# Patient Record
Sex: FEMALE | Race: WHITE | URBAN - METROPOLITAN AREA
[De-identification: names, ages, dates, MRNs, and addresses within clinical notes are randomized per-mention and may not be internally consistent; named-entity substitution may affect disease eponyms.]

---

## 2024-01-09 ENCOUNTER — TELEPHONE (OUTPATIENT)
Dept: ORTHOPEDIC SURGERY | Age: 54
End: 2024-01-09

## 2024-01-16 ENCOUNTER — OFFICE VISIT (OUTPATIENT)
Dept: ORTHOPEDIC SURGERY | Age: 54
End: 2024-01-16
Payer: COMMERCIAL

## 2024-01-16 VITALS — WEIGHT: 156 LBS | HEIGHT: 66 IN | BODY MASS INDEX: 25.07 KG/M2

## 2024-01-16 DIAGNOSIS — M50.30 DDD (DEGENERATIVE DISC DISEASE), CERVICAL: ICD-10-CM

## 2024-01-16 DIAGNOSIS — M54.12 CERVICAL RADICULOPATHY: ICD-10-CM

## 2024-01-16 DIAGNOSIS — M47.812 CERVICAL SPONDYLOSIS: Primary | ICD-10-CM

## 2024-01-16 PROCEDURE — 99204 OFFICE O/P NEW MOD 45 MIN: CPT | Performed by: PHYSICIAN ASSISTANT

## 2024-01-16 RX ORDER — TIZANIDINE 2 MG/1
2 TABLET ORAL 2 TIMES DAILY PRN
Qty: 60 TABLET | Refills: 2 | Status: SHIPPED | OUTPATIENT
Start: 2024-01-16

## 2024-01-16 RX ORDER — MELOXICAM 15 MG/1
15 TABLET ORAL DAILY
Qty: 30 TABLET | Refills: 2 | Status: SHIPPED | OUTPATIENT
Start: 2024-01-16 | End: 2024-04-15

## 2024-01-16 NOTE — PROGRESS NOTES
Name: Cristy Guillen  YOB: 1970  Gender: female  MRN: [id number]    CC:   Chief Complaint   Patient presents with    New Patient     Neck pain          HPI:   Cristy Guillen is a 54 y.o. female with a PMHx of no significant past medical history.         They present here for evaluation of neck and right shoulder pain.  She has had it about a year, but feels it got worse in the last few weeks.  She owns a cleaning business and was using a backpack vacuum a few weeks ago and the day after felt like she had increased neck pain rating to the right shoulder.  She is also had burning sensation down her right arm and occasionally to her right hand and fingers.  No numbness or tingling in the arm or the hand.  She did feel a shocklike pain to the back of her neck and the day following this backpack vacuum session.  Several days later she had a second incident where she was walking her dog and the dog jerked her arm and seem to aggravate her neck and shoulder pain as well.  She has been seeing a chiropractor for a long time.  She has used over-the-counter anti-inflammatories as well as an old prescription for Flexeril the last few weeks.  She is also been using ice and heat.  She is a little bit better today than she has been the last few weeks but still having right shoulder pain.  No clumsiness of her hands.  No balance issues or trouble walking.          Past Medical History Includes: No past medical history on file., No past surgical history on file.  Family History: No family history on file.   Social History:   Social History     Tobacco Use    Smoking status: Former     Types: Cigarettes    Smokeless tobacco: Never   Substance Use Topics    Alcohol use: Not on file       ALLERGIES: No Known Allergies       Patient Medications    No current outpatient medications on file.     No current facility-administered medications for this visit.             Review of Systems:  As per HPI.  Pertinent positives